# Patient Record
Sex: MALE | Race: BLACK OR AFRICAN AMERICAN | Employment: OTHER | URBAN - METROPOLITAN AREA
[De-identification: names, ages, dates, MRNs, and addresses within clinical notes are randomized per-mention and may not be internally consistent; named-entity substitution may affect disease eponyms.]

---

## 2017-05-15 ENCOUNTER — HOSPITAL ENCOUNTER (OUTPATIENT)
Dept: LAB | Age: 67
Discharge: HOME | End: 2017-05-15

## 2017-05-15 LAB — SENTARA SPECIMEN COL,SENBCF: NORMAL

## 2017-05-15 PROCEDURE — 99001 SPECIMEN HANDLING PT-LAB: CPT | Performed by: INTERNAL MEDICINE

## 2017-07-27 ENCOUNTER — HOSPITAL ENCOUNTER (OUTPATIENT)
Dept: LAB | Age: 67
Discharge: HOME | End: 2017-07-27

## 2017-07-27 ENCOUNTER — HOSPITAL ENCOUNTER (OUTPATIENT)
Dept: GENERAL RADIOLOGY | Age: 67
Discharge: HOME | End: 2017-07-27
Payer: MEDICARE

## 2017-07-27 DIAGNOSIS — R52 PAIN: ICD-10-CM

## 2017-07-27 LAB — SENTARA SPECIMEN COL,SENBCF: NORMAL

## 2017-07-27 PROCEDURE — 73130 X-RAY EXAM OF HAND: CPT

## 2017-07-27 PROCEDURE — 73110 X-RAY EXAM OF WRIST: CPT

## 2017-07-27 PROCEDURE — 99001 SPECIMEN HANDLING PT-LAB: CPT | Performed by: INTERNAL MEDICINE

## 2018-02-15 PROBLEM — C61 PROSTATE CANCER (HCC): Status: ACTIVE | Noted: 2018-02-15

## 2018-05-31 ENCOUNTER — HOSPITAL ENCOUNTER (OUTPATIENT)
Dept: LAB | Age: 68
Discharge: HOME | End: 2018-05-31

## 2018-05-31 LAB — SENTARA SPECIMEN COL,SENBCF: NORMAL

## 2018-05-31 PROCEDURE — 99001 SPECIMEN HANDLING PT-LAB: CPT | Performed by: INTERNAL MEDICINE

## 2018-07-30 ENCOUNTER — HOSPITAL ENCOUNTER (OUTPATIENT)
Dept: LAB | Age: 68
Discharge: HOME | End: 2018-07-30

## 2018-07-30 LAB — SENTARA SPECIMEN COL,SENBCF: NORMAL

## 2018-07-30 PROCEDURE — 99001 SPECIMEN HANDLING PT-LAB: CPT | Performed by: INTERNAL MEDICINE

## 2018-08-01 ENCOUNTER — HOSPITAL ENCOUNTER (OUTPATIENT)
Dept: VASCULAR SURGERY | Age: 68
Discharge: HOME | End: 2018-08-01
Attending: INTERNAL MEDICINE
Payer: MEDICARE

## 2018-08-01 DIAGNOSIS — H34.232 BRANCH RETINAL ARTERY OCCLUSION OF LEFT EYE: ICD-10-CM

## 2018-08-01 LAB
LEFT CCA DIST DIAS: 17.75 CM/S
LEFT CCA DIST SYS: 70.69 CM/S
LEFT CCA MID DIAS: 17.75 CM/S
LEFT CCA MID SYS: 66.33 CM/S
LEFT CCA PROX DIAS: 25.08 CM/S
LEFT CCA PROX SYS: 107.1 CM/S
LEFT ECA DIAS: 0 CM/S
LEFT ECA SYS: 73.19 CM/S
LEFT ICA DIST DIAS: 33.51 CM/S
LEFT ICA DIST SYS: 77.42 CM/S
LEFT ICA MID DIAS: 24.6 CM/S
LEFT ICA MID SYS: 64.47 CM/S
LEFT ICA PROX DIAS: 20.87 CM/S
LEFT ICA PROX SYS: 64.47 CM/S
LEFT ICA/CCA SYS: 0.72
LEFT SUBCLAVIAN SYS: 95.2 CM/S
LEFT VERTEBRAL DIAS: 0 CM/S
LEFT VERTEBRAL SYS: 33.79 CM/S
RIGHT CCA DIST DIAS: 17.92 CM/S
RIGHT CCA DIST SYS: 72.07 CM/S
RIGHT CCA MID DIAS: 15.53 CM/S
RIGHT CCA MID SYS: 81.62 CM/S
RIGHT CCA PROX DIAS: 15.53 CM/S
RIGHT CCA PROX SYS: 91.97 CM/S
RIGHT ECA DIAS: 13.14 CM/S
RIGHT ECA SYS: 97.55 CM/S
RIGHT ICA DIST DIAS: 25.59 CM/S
RIGHT ICA DIST SYS: 67.84 CM/S
RIGHT ICA MID DIAS: 16.67 CM/S
RIGHT ICA MID SYS: 50 CM/S
RIGHT ICA PROX DIAS: 17.75 CM/S
RIGHT ICA PROX SYS: 64.47 CM/S
RIGHT ICA/CCA SYS: 0.74
RIGHT SUBCLAVIAN DIAS: 0 CM/S
RIGHT SUBCLAVIAN SYS: 91.97 CM/S
RIGHT VERTEBRAL DIAS: 14.32 CM/S
RIGHT VERTEBRAL SYS: 54.22 CM/S

## 2018-08-01 PROCEDURE — 93880 EXTRACRANIAL BILAT STUDY: CPT

## 2018-08-23 PROBLEM — E66.01 SEVERE OBESITY (BMI 35.0-39.9): Status: ACTIVE | Noted: 2018-08-23

## 2019-06-06 PROBLEM — R06.09 DOE (DYSPNEA ON EXERTION): Status: ACTIVE | Noted: 2017-05-22

## 2019-07-01 ENCOUNTER — HOSPITAL ENCOUNTER (OUTPATIENT)
Dept: MAMMOGRAPHY | Age: 69
Discharge: HOME | End: 2019-07-01
Attending: INTERNAL MEDICINE
Payer: MEDICARE

## 2019-07-01 ENCOUNTER — HOSPITAL ENCOUNTER (OUTPATIENT)
Dept: ULTRASOUND IMAGING | Age: 69
Discharge: HOME | End: 2019-07-01
Attending: INTERNAL MEDICINE
Payer: MEDICARE

## 2019-07-01 DIAGNOSIS — N64.4 BREAST PAIN: ICD-10-CM

## 2019-07-01 PROCEDURE — 77066 DX MAMMO INCL CAD BI: CPT

## 2019-09-05 ENCOUNTER — HOSPITAL ENCOUNTER (OUTPATIENT)
Dept: LAB | Age: 69
Discharge: HOME | End: 2019-09-05

## 2019-09-05 LAB — SENTARA SPECIMEN COL,SENBCF: NORMAL

## 2019-09-05 PROCEDURE — 99001 SPECIMEN HANDLING PT-LAB: CPT

## 2019-09-23 ENCOUNTER — HOSPITAL ENCOUNTER (OUTPATIENT)
Dept: LAB | Age: 69
Discharge: HOME | End: 2019-09-23

## 2019-09-23 LAB — SENTARA SPECIMEN COL,SENBCF: NORMAL

## 2019-09-23 PROCEDURE — 99001 SPECIMEN HANDLING PT-LAB: CPT

## 2020-02-19 ENCOUNTER — HOSPITAL ENCOUNTER (OUTPATIENT)
Dept: LAB | Age: 70
Discharge: HOME | End: 2020-02-19

## 2020-02-19 LAB — SENTARA SPECIMEN COL,SENBCF: NORMAL

## 2020-02-19 PROCEDURE — 99001 SPECIMEN HANDLING PT-LAB: CPT

## 2024-11-14 ENCOUNTER — TELEPHONE (OUTPATIENT)
Dept: OPHTHALMOLOGY | Facility: CLINIC | Age: 74
End: 2024-11-14
Payer: MEDICARE

## 2024-11-14 NOTE — TELEPHONE ENCOUNTER
----- Message from Stormy sent at 11/14/2024 12:41 PM CST -----  Type:  Sooner Apoointment Request    Caller is requesting a sooner appointment.  Caller declined first available appointment listed below.  Caller will not accept being placed on the waitlist and is requesting a message be sent to doctor.  Name of Caller: Pt  When is the first available appointment?  Symptoms: Glycoma   Would the patient rather a call back or a response via [facility]? Call  Best Call Back Number: [phone]  Additional Information:  Pt would like to speak to someone in office for an appt